# Patient Record
Sex: MALE | Race: WHITE | NOT HISPANIC OR LATINO | URBAN - METROPOLITAN AREA
[De-identification: names, ages, dates, MRNs, and addresses within clinical notes are randomized per-mention and may not be internally consistent; named-entity substitution may affect disease eponyms.]

---

## 2021-08-28 ENCOUNTER — TELEPHONE ENCOUNTER (OUTPATIENT)
Dept: URBAN - METROPOLITAN AREA CLINIC 13 | Facility: CLINIC | Age: 76
End: 2021-08-28

## 2021-08-29 ENCOUNTER — TELEPHONE ENCOUNTER (OUTPATIENT)
Dept: URBAN - METROPOLITAN AREA CLINIC 13 | Facility: CLINIC | Age: 76
End: 2021-08-29

## 2022-03-02 LAB
ALBUMIN SERPL-MCNC: 4.4 G/DL (ref 3.9–5)
ALT SERPL-CCNC: 13 UNITS/L (ref 7–56)
BUN SERPL-MCNC: 15 MG/DL (ref 9–20)
BUN/CREAT SERPL: 17 %
CALCIUM SERPL-MCNC: 9.2 MG/DL (ref 8.4–10.2)
HCT VFR BLD CALC: 41.6 % (ref 35.5–45.6)
HEMOLYSIS INDEX: 3
HGB BLD-MCNC: 14.1 GM/DL (ref 11.8–15.2)
MCHC RBC AUTO-ENTMCNC: 34 % (ref 32–34)
MCV RBC AUTO: 92 FL (ref 84–94)
PLATELET # BLD: 196 K/MM3 (ref 140–440)
RBC # BLD AUTO: 4.51 M/MM3 (ref 3.65–5.03)

## 2022-03-09 ENCOUNTER — HOSPITAL ENCOUNTER (OUTPATIENT)
Dept: HOSPITAL 5 - OR | Age: 77
Discharge: HOME | End: 2022-03-09
Attending: UROLOGY
Payer: MEDICARE

## 2022-03-09 VITALS — SYSTOLIC BLOOD PRESSURE: 145 MMHG | DIASTOLIC BLOOD PRESSURE: 74 MMHG

## 2022-03-09 DIAGNOSIS — C67.2: Primary | ICD-10-CM

## 2022-03-09 DIAGNOSIS — Z98.41: ICD-10-CM

## 2022-03-09 DIAGNOSIS — C67.0: ICD-10-CM

## 2022-03-09 DIAGNOSIS — Z20.822: ICD-10-CM

## 2022-03-09 DIAGNOSIS — Z88.0: ICD-10-CM

## 2022-03-09 DIAGNOSIS — Z98.890: ICD-10-CM

## 2022-03-09 DIAGNOSIS — K21.9: ICD-10-CM

## 2022-03-09 DIAGNOSIS — Z98.42: ICD-10-CM

## 2022-03-09 DIAGNOSIS — Z87.891: ICD-10-CM

## 2022-03-09 PROCEDURE — 80053 COMPREHEN METABOLIC PANEL: CPT

## 2022-03-09 PROCEDURE — 52234 CYSTOSCOPY AND TREATMENT: CPT

## 2022-03-09 PROCEDURE — 36415 COLL VENOUS BLD VENIPUNCTURE: CPT

## 2022-03-09 PROCEDURE — U0003 INFECTIOUS AGENT DETECTION BY NUCLEIC ACID (DNA OR RNA); SEVERE ACUTE RESPIRATORY SYNDROME CORONAVIRUS 2 (SARS-COV-2) (CORONAVIRUS DISEASE [COVID-19]), AMPLIFIED PROBE TECHNIQUE, MAKING USE OF HIGH THROUGHPUT TECHNOLOGIES AS DESCRIBED BY CMS-2020-01-R: HCPCS

## 2022-03-09 PROCEDURE — 85027 COMPLETE CBC AUTOMATED: CPT

## 2022-03-09 NOTE — ANESTHESIA CONSULTATION
Anesthesia Consult and Med Hx


Date of service: 03/09/22





- Airway


Anesthetic Teeth Evaluation: Good


ROM Head & Neck: Adequate


Mental/Hyoid Distance: Adequate


Mallampati Class: Class III


Intubation Access Assessment: Possibly Difficult





- Pre-Operative Health Status


ASA Pre-Surgery Classification: ASA2


Proposed Anesthetic Plan: General





- Pulmonary


Hx Smoking: Yes (former smoker quit 15yrs ago)


Hx Respiratory Symptoms: No


Hx Sleep Apnea: No (JOSEPH PRE SCREEN HIGH RISK)





- Cardiovascular System


Hx Hypertension: No


Hx Heart Attack/AMI: No





- Central Nervous System


CVA: No


Hx Back Pain: Yes





- Endocrine


Hx Renal Disease: No


Hx Liver Disease: No


Hx Insulin Dependent Diabetes: No


Hx Non-Insulin Dependent Diabetes: No


Hx Thyroid Disease: No





- Other Systems


Hx Cancer: Yes (bladder ca)





- Additional Comments


Anesthesia Medical History Comments: No hx anesthetic complications.

## 2022-03-09 NOTE — OPERATIVE REPORT
DATE OF SURGERY: 03/09/2022



TIME:  Approximately 8:00 a.m.



PREOPERATIVE DIAGNOSES:

1.  History of bladder cancer.

2.  Recurrent tumor.



POSTOPERATIVE DIAGNOSES:

1.  History of bladder cancer.

2.  Recurrent tumor.



OPERATIVE PROCEDURES:  Cystoscopy, fulguration of an approximately 2 cm area on 

the left trigone of the bladder.



SPECIMEN:  Bladder wash.



ANESTHESIA:  General.



ESTIMATED BLOOD LOSS:  5 mL



DRAINS:  None.



COMPLICATIONS:  None.



INDICATIONS FOR PROCEDURE:  The patient is a 76-year-old man with a history of 

high-grade bladder cancer, status post intravesical therapy.  On surveillance 

cystoscopy, he had a recurrent lesion.  He was brought to the operating room for

fulguration.



DESCRIPTION OF PROCEDURE IN DETAIL:  After induction of suitable anesthesia and 

proper positioning and preparation in the dorsal lithotomy position, a 

cystoscope was used to enter the bladder under direct visualization.  The 

patient had a mildly obstructive prostate.  The lesion seen on local cystoscopy 

on the left trigone was once again identified.  Using a Bugbee electrocautery, 

this area was copiously fulgurated.  It was approximately 2 cm in size.  There 

was excellent hemostasis.  The rest of the bladder was examined and there were 

no other lesions.  The bladder was drained, and the cystoscope was removed.



The patient was then awakened from anesthesia and transferred to the recovery 

room in stable condition.



He will return in approximately 2 weeks for a followup visit to discuss the 

potential need for further intravesical therapy.







DD: 03/09/2022 08:58 AM

DT: 03/09/2022 09:11 AM

TID: [id number] RECEIPT: [id number]

KRYS/DAVID

## 2022-04-30 ENCOUNTER — TELEPHONE ENCOUNTER (OUTPATIENT)
Dept: URBAN - METROPOLITAN AREA CLINIC 121 | Facility: CLINIC | Age: 77
End: 2022-04-30

## 2022-05-01 ENCOUNTER — TELEPHONE ENCOUNTER (OUTPATIENT)
Dept: URBAN - METROPOLITAN AREA CLINIC 121 | Facility: CLINIC | Age: 77
End: 2022-05-01

## 2022-08-10 ENCOUNTER — HOSPITAL ENCOUNTER (OUTPATIENT)
Dept: HOSPITAL 5 - OR | Age: 77
Discharge: HOME | End: 2022-08-10
Attending: UROLOGY
Payer: MEDICARE

## 2022-08-10 VITALS — DIASTOLIC BLOOD PRESSURE: 70 MMHG | SYSTOLIC BLOOD PRESSURE: 143 MMHG

## 2022-08-10 DIAGNOSIS — K21.9: ICD-10-CM

## 2022-08-10 DIAGNOSIS — Z98.890: ICD-10-CM

## 2022-08-10 DIAGNOSIS — Z98.41: ICD-10-CM

## 2022-08-10 DIAGNOSIS — Z79.899: ICD-10-CM

## 2022-08-10 DIAGNOSIS — Z88.0: ICD-10-CM

## 2022-08-10 DIAGNOSIS — Z98.42: ICD-10-CM

## 2022-08-10 DIAGNOSIS — C67.3: ICD-10-CM

## 2022-08-10 DIAGNOSIS — Z87.891: ICD-10-CM

## 2022-08-10 DIAGNOSIS — N28.89: Primary | ICD-10-CM

## 2022-08-10 PROCEDURE — C1889 IMPLANT/INSERT DEVICE, NOC: HCPCS

## 2022-08-10 PROCEDURE — 74420 UROGRAPHY RTRGR +-KUB: CPT

## 2022-08-10 PROCEDURE — C1769 GUIDE WIRE: HCPCS

## 2022-08-10 PROCEDURE — 52332 CYSTOSCOPY AND TREATMENT: CPT

## 2022-08-10 PROCEDURE — C1758 CATHETER, URETERAL: HCPCS

## 2022-08-10 PROCEDURE — U0003 INFECTIOUS AGENT DETECTION BY NUCLEIC ACID (DNA OR RNA); SEVERE ACUTE RESPIRATORY SYNDROME CORONAVIRUS 2 (SARS-COV-2) (CORONAVIRUS DISEASE [COVID-19]), AMPLIFIED PROBE TECHNIQUE, MAKING USE OF HIGH THROUGHPUT TECHNOLOGIES AS DESCRIBED BY CMS-2020-01-R: HCPCS

## 2022-08-10 NOTE — ANESTHESIA CONSULTATION
Anesthesia Consult and Med Hx


Date of service: 08/10/22





- Airway


Anesthetic Teeth Evaluation: Good, Caps, Crowns


ROM Head & Neck: Adequate


Mental/Hyoid Distance: Adequate


Mallampati Class: Class II


Intubation Access Assessment: Good





- Pre-Operative Health Status


ASA Pre-Surgery Classification: ASA2


Proposed Anesthetic Plan: General





- Pulmonary


Hx Smoking: Yes (STOPPED 2007)


Hx Respiratory Symptoms: No


Hx Sleep Apnea: No (JOSEPH PRE SCREEN HIGH RISK)





- Cardiovascular System


Hx Hypertension: No


Hx Heart Attack/AMI: No





- Central Nervous System


CVA: No


Hx Back Pain: Yes


Hx Psychiatric Problems: No





- Gastrointestinal


Hx Gastroesophageal Reflux Disease: No





- Endocrine


Hx Renal Disease: No


Hx Liver Disease: No


Hx Insulin Dependent Diabetes: No


Hx Non-Insulin Dependent Diabetes: No


Hx Thyroid Disease: No





- Hematic


Hx Anemia: No


Hx Sickle Cell Disease: No





- Other Systems


Hx Alcohol Use: No


Hx Substance Use: No


Hx Cancer: Yes (bladder ca)


Hx Obesity: No

## 2022-08-10 NOTE — FLUOROSCOPY REPORT
FLUOROSCOPY RETROGRADE UROGRAPHY



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 



COMPARISON: None.



IMPRESSION:  45 seconds of fluoroscopy time was provided by radiology during left retrograde urograph
y and left ureteral stent placement.  6 fluoroscopic images are presented. Please correlate with the 
procedural report as needed.



Signer Name: Osvaldo Choe Jr, MD 

Signed: 8/10/2022 2:07 PM

Workstation Name: [id number]

## 2022-08-10 NOTE — OPERATIVE REPORT
DATE OF SURGERY: 08/10/2022



TIME:  At approximately 11:30 a.m.



PREOPERATIVE DIAGNOSES:

1.  History of bladder cancer, status post BCG.

2.  Filling defect on left ureter.



POSTOPERATIVE DIAGNOSES:

1.  History of bladder cancer, status post BCG.

2.  Filling defect on left ureter.

3.  Distal left ureteral mass.



OPERATIVE PROCEDURES:

1.  Cystoscopy.

2.  Left retrograde pyelogram.

3.  Left ureteroscopy.

4.  Left double-J stent insertion, 6 x 24.



ATTENDING:  Ortiz Gustafson MD



ASSISTANTS:  None.



ANESTHESIA:  General.



ESTIMATED BLOOD LOSS:  10 mL.



DRAINS:  A 6 x 24 double-J stent.



SPECIMENS:

1.  Bladder wash for cytology.

2.  Left renal wash for cytology.



COMPLICATIONS:  None.



INDICATIONS FOR PROCEDURE:  The patient is a 77-year-old man with a history of 

high-grade bladder cancer, status post BCG, who was found to have a possible 

left distal ureteral filling defect.  He returned today for surveillance 

cystoscopy and left retrograde pyelogram and possible other procedures to 

evaluate the filling defect.



DESCRIPTION OF PROCEDURE:  After induction of suitable anesthesia and proper 

positioning and preparation in the dorsal lithotomy position, a cystoscope was 

used to enter the bladder under direct visualization.  There were no bladder or 

urethral mucosal lesions pending washings.  The left ureteral orifice was 

identified and a wire was placed into the kidney to allow for advancement of an 

open-ended retro catheter.  A left retrograde pyelogram was performed, which 

showed no filling defects in the renal pelvis or proximal or mid ureter.  There 

was some ballooning in the distal ureter with some proximal hydronephrosis.  A 

wire was reinserted and a rigid ureteroscope was used to perform direct 

visualization of the distal left ureter.  Upon entering it, a second wire was 

used to help guide the ureteroscope into the undilated ureteral orifice.  Upon 

entry into the distal ureter, a tumor was visible.  It appeared very papillary, 

but it was occluding the lumen.  I was not able to negotiate the rigid 

ureteroscope past the tumor.  An aspiration biopsy was then taken to allow for 

evaluation of the grade of the tumor.  After the ureteroscope was removed, a 6 x

24 double-J stent was inserted in the usual manner, so that the ureter can be 

dilated to allow for followup biopsies if needed.  If the cytology shows 

high-grade tumor, the patient may just proceed directly to a left distal 

ureterectomy with reimplantation.



For now, the patient will be discharged home with the stent in place.  Followup 

will be determined based on pathology review.  He should return to the office on

Monday for discussion.



As the attending surgeon, I was present and performed the entire procedure.







DD: 08/10/2022 12:40 PM

DT: 08/10/2022 01:32 PM

TID: [id number] RECEIPT: [id number]

DJC/HEM/TAELOY Handoff

## 2023-01-03 ENCOUNTER — OFFICE VISIT (OUTPATIENT)
Dept: URBAN - METROPOLITAN AREA CLINIC 118 | Facility: CLINIC | Age: 78
End: 2023-01-03

## 2023-01-11 ENCOUNTER — OFFICE VISIT (OUTPATIENT)
Dept: URBAN - METROPOLITAN AREA CLINIC 118 | Facility: CLINIC | Age: 78
End: 2023-01-11
Payer: MEDICARE

## 2023-01-11 ENCOUNTER — LAB OUTSIDE AN ENCOUNTER (OUTPATIENT)
Dept: URBAN - METROPOLITAN AREA CLINIC 118 | Facility: CLINIC | Age: 78
End: 2023-01-11

## 2023-01-11 VITALS
WEIGHT: 167.2 LBS | HEART RATE: 80 BPM | DIASTOLIC BLOOD PRESSURE: 75 MMHG | TEMPERATURE: 97.7 F | BODY MASS INDEX: 26.87 KG/M2 | SYSTOLIC BLOOD PRESSURE: 171 MMHG | HEIGHT: 66 IN

## 2023-01-11 DIAGNOSIS — Z12.11 SCREENING FOR COLON CANCER: ICD-10-CM

## 2023-01-11 DIAGNOSIS — R13.19 ESOPHAGEAL DYSPHAGIA: ICD-10-CM

## 2023-01-11 PROCEDURE — 99204 OFFICE O/P NEW MOD 45 MIN: CPT

## 2023-01-11 NOTE — HPI-TODAY'S VISIT:
Mr. Maurer is a 78 y/o WM who presents today for evaluation of dysphagia.  He reports this has been occuring for several months now but worsening recently. He feels like food get stucks substernally and at times feels like he is having a heart attack when it gets stuck. Not every time. Only with solid foods, not pills or liquids. Denies reflux/ heartburn, odynphagia, weight loss, NV or hematemesis. Does not smoke. No prior EGD.  He is also due for colonoscopy. Last colonoscopy was 7 years ago with recommendation to repeat in 5 years. Unsure if he has hx of polyps. FH of colon cancer in his grandfather. Denies GI bleeding, change sin bowel habits or abdominal pain.

## 2023-01-13 PROBLEM — 40890009: Status: ACTIVE | Noted: 2023-01-13

## 2023-01-13 PROBLEM — 305058001: Status: ACTIVE | Noted: 2023-01-13

## 2023-02-02 ENCOUNTER — CLAIMS CREATED FROM THE CLAIM WINDOW (OUTPATIENT)
Dept: URBAN - METROPOLITAN AREA CLINIC 4 | Facility: CLINIC | Age: 78
End: 2023-02-02
Payer: MEDICARE

## 2023-02-02 ENCOUNTER — OFFICE VISIT (OUTPATIENT)
Dept: URBAN - METROPOLITAN AREA SURGERY CENTER 23 | Facility: SURGERY CENTER | Age: 78
End: 2023-02-02
Payer: MEDICARE

## 2023-02-02 DIAGNOSIS — C15.5 CANCER OF DISTAL THIRD OF ESOPHAGUS: ICD-10-CM

## 2023-02-02 DIAGNOSIS — C15.9 MALIGNANT NEOPLASM OF ESOPHAGUS, UNSPECIFIED: ICD-10-CM

## 2023-02-02 PROCEDURE — 88305 TISSUE EXAM BY PATHOLOGIST: CPT | Performed by: PATHOLOGY

## 2023-02-02 PROCEDURE — 88341 IMHCHEM/IMCYTCHM EA ADD ANTB: CPT | Performed by: PATHOLOGY

## 2023-02-02 PROCEDURE — 43239 EGD BIOPSY SINGLE/MULTIPLE: CPT | Performed by: INTERNAL MEDICINE

## 2023-02-02 PROCEDURE — G8907 PT DOC NO EVENTS ON DISCHARG: HCPCS | Performed by: INTERNAL MEDICINE

## 2023-02-02 PROCEDURE — 88342 IMHCHEM/IMCYTCHM 1ST ANTB: CPT | Performed by: PATHOLOGY

## 2023-02-06 ENCOUNTER — TELEPHONE ENCOUNTER (OUTPATIENT)
Dept: URBAN - METROPOLITAN AREA CLINIC 92 | Facility: CLINIC | Age: 78
End: 2023-02-06

## 2023-02-20 PROBLEM — 187727005 MALIGNANT NEOPLASM OF LOWER THIRD OF ESOPHAGUS: Status: ACTIVE | Noted: 2023-02-20

## 2023-05-16 ENCOUNTER — P2P PATIENT RECORD (OUTPATIENT)
Age: 78
End: 2023-05-16

## 2023-05-23 ENCOUNTER — OFFICE VISIT (OUTPATIENT)
Dept: URBAN - METROPOLITAN AREA CLINIC 118 | Facility: CLINIC | Age: 78
End: 2023-05-23
Payer: MEDICARE

## 2023-05-23 VITALS
HEART RATE: 128 BPM | TEMPERATURE: 98.1 F | SYSTOLIC BLOOD PRESSURE: 127 MMHG | WEIGHT: 139 LBS | DIASTOLIC BLOOD PRESSURE: 59 MMHG | HEIGHT: 66 IN | BODY MASS INDEX: 22.34 KG/M2

## 2023-05-23 DIAGNOSIS — C15.9 ESOPHAGEAL ADENOCARCINOMA: ICD-10-CM

## 2023-05-23 DIAGNOSIS — E43 SEVERE PROTEIN-CALORIE MALNUTRITION: ICD-10-CM

## 2023-05-23 PROCEDURE — 99214 OFFICE O/P EST MOD 30 MIN: CPT | Performed by: INTERNAL MEDICINE

## 2023-05-23 RX ORDER — PANTOPRAZOLE SODIUM 40 MG/1
1 TABLET TABLET, DELAYED RELEASE ORAL ONCE A DAY
Qty: 30 | Refills: 5 | OUTPATIENT
Start: 2023-05-24

## 2023-05-23 NOTE — HPI-TODAY'S VISIT:
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  He has done relatively well other than weight loss.  He has lost about 30 pounds.  There is no vomiting, hematemesis, or blood in his stools.  He does have a significant loss of appetite.  The radiation only finished yesterday and the chemotherapy 1 week ago.  A PET scan at the start of therapy showed no evidence of metastatic lesion, although the patient did have evidence of a skin metastatic deposit on his head prior to the diagnosis.  He is able to consume Ensure 2-3 times per day but has little appetite for regular food.  He has not followed up with cardiothoracic surgery about the timing of his esophageal stent removal or replacement.  He only has mild nausea and no severe vomiting.  He is not currently on antiacid therapy

## 2023-05-24 ENCOUNTER — DASHBOARD ENCOUNTERS (OUTPATIENT)
Age: 78
End: 2023-05-24

## 2023-05-24 PROBLEM — 238107002: Status: ACTIVE | Noted: 2023-05-24

## 2023-05-24 PROBLEM — 276803003: Status: ACTIVE | Noted: 2023-05-24

## 2023-05-26 ENCOUNTER — TELEPHONE ENCOUNTER (OUTPATIENT)
Dept: URBAN - METROPOLITAN AREA CLINIC 23 | Facility: CLINIC | Age: 78
End: 2023-05-26

## 2023-06-27 ENCOUNTER — OFFICE VISIT (OUTPATIENT)
Dept: URBAN - METROPOLITAN AREA CLINIC 118 | Facility: CLINIC | Age: 78
End: 2023-06-27